# Patient Record
Sex: FEMALE | Race: BLACK OR AFRICAN AMERICAN
[De-identification: names, ages, dates, MRNs, and addresses within clinical notes are randomized per-mention and may not be internally consistent; named-entity substitution may affect disease eponyms.]

---

## 2019-03-06 ENCOUNTER — HOSPITAL ENCOUNTER (EMERGENCY)
Dept: HOSPITAL 88 - FSED | Age: 24
Discharge: HOME | End: 2019-03-06
Payer: COMMERCIAL

## 2019-03-06 VITALS — HEIGHT: 60 IN | BODY MASS INDEX: 53.99 KG/M2 | WEIGHT: 275 LBS

## 2019-03-06 DIAGNOSIS — R06.09: ICD-10-CM

## 2019-03-06 DIAGNOSIS — M25.561: Primary | ICD-10-CM

## 2019-03-06 DIAGNOSIS — J45.909: ICD-10-CM

## 2019-03-06 PROCEDURE — 93971 EXTREMITY STUDY: CPT

## 2019-03-06 PROCEDURE — 93005 ELECTROCARDIOGRAM TRACING: CPT

## 2019-03-06 PROCEDURE — 71046 X-RAY EXAM CHEST 2 VIEWS: CPT

## 2019-03-06 NOTE — DIAGNOSTIC IMAGING REPORT
KNEE 3VW RT - HOPD 



HISTORY:  Right leg swelling.    

COMPARISON: None available.

     

FINDINGS:

Bones:

No acute displaced fracture.  

Osseous alignment is within normal limits.



Joints:

The joint spaces are well-maintained.



Soft tissues:

The soft tissues appear unremarkable.





IMPRESSION: 

No acute radiographic abnormality.



Signed by: DR. Amarjit Kc MD on 3/6/2019 10:34 PM

## 2019-03-06 NOTE — XMS REPORT
Summary of Care: 2/12/15 - 2/12/15

                             Created on: 2124



GLORY WAN

External Reference #: [id number]

: 1995

Sex: Female



Demographics







                          Address                   [id number]

[place]  [address]-

 

                          Home Phone                [phone]

 

                          Preferred Language        English

 

                          Marital Status            Single

 

                          Roman Catholic Affiliation     Unknown

 

                          Race                      Other

 

                          Ethnic Group              Non-





Author







                          Organization              Unknown

 

                          Address                   Unknown

 

                          Phone                     Unavailable







Encounter





HQ Jah(SEKOU) [id number] Date(s): 2/12/15 - 2/12/15

[facility] [address]

Discharge Disposition: Non-Emergent

Physician Attending: Linda Robert MD





Reason for Visit





CONGESTION / SORE THROAT



Vital Signs







 



                           Most recent to            1



                                         oldest [Reference 



                                         Range]: 

 

 



                           Height                    152.4 cm



                                         (2/12/15 1:06 PM)

 

 



                           Temperature Oral          97.9 DegF



                           [96.4-99.1 DegF]          (2/12/15 1:06 PM)

 

 



                           Systolic Blood            16 mmHg



                           Pressure [          *LOW*



                           mmHg]                     (2/12/15 1:06 PM)

 

 



                           Diastolic Blood           82 mmHg



                           Pressure [60-90           (2/12/15 1:06 PM)



                                         mmHg] 

 

 



                           Respiratory Rate          18 BRMIN



                           [14-20 BRMIN]             (2/12/15 1:06 PM)

 

 



                           Peripheral Pulse          92 bpm



                           Rate [ bpm]         (2/12/15 1:06 PM)

 

 



                           Weight                    86.364 kg



                                         (2/12/15 1:06 PM)

 

 



                           Body Mass Index           37.18 m2



                                         (2/12/15 1:06 PM)







Problem List





No data available for this section



Allergies, Adverse Reactions, Alerts







   



                 Substance       Reaction        Severity        Status

 

   



                           sulfa drugs               Active







Medications





No data available for this section



Results





RAPID





 



                           Most recent to            1



                                         oldest [Reference 



                                         Range]: 

 

 



                           Grp A Strep Scr           Negative



                           [Negative]                (2/12/15 2:48 PM)







VIRAL - SEROLOGY





 



                           Most recent to            1



                                         oldest [Reference 



                                         Range]: 

 

 



                           Influ A [Negative]        Negative



                                         (2/12/15 2:48 PM)

 

 



                           Influ B [Negative]        Negative 1



                                         (2/12/15 2:48 PM)







1Interpretive Data:   Influenza A&B Antigen:

Due to the low sensitivity of this test a negative result does not exclude influ
ward virus infection. A diagnosis of influenza should be considered based on a p
atient's clinical presentation and empiric antiviral treatment should be conside
red, if indicated. If more conclusive testing is desired, follow-up confirmatory
 testing with either viral culture or PCR is warranted.



Medications Administered During Your Visit





No data available for this section



Immunizations





No data available for this section



Procedures







   



                 Procedure Type     Body Site       Date of Procedure     Related Diagnosis

 

   



                                         Hernia repair   







Social History







 



                           Social History Type       Response

 

 



                           Substance Abuse           Use: None

 

 



                           Alcohol                   Use: Never

 

 



                           Smoking Status            Never smoker, Exposure to Tobacco Smoke None, Cigarette Smoking

 Last 365



                                         Days No, Reg Smoking Cessation Counseling No

## 2019-03-06 NOTE — XMS REPORT
Summary of Care: 18 - 18

                             Created on: 2085



GLORY MCKENNA

External Reference #: [id number]

: 1995

Sex: Female



Demographics







                          Address                   *[facility] [id number]

[place]  [address]-

 

                          Home Phone                [phone]

 

                          Preferred Language        English

 

                          Marital Status            

 

                          Christian Affiliation     Unknown

 

                          Race                      Black or 

 

                                        Additional Race(s)  

 

                          Ethnic Group              Non-





Author







                          Author                    [facility]

 

                          Organization              [facility]

 

                          Address                   Unknown

 

                          Phone                     Unavailable







Encounter





JOSE Tyson(FIN) [id number] Date(s): 18 - 18

[facility] [address] [facility] [address] [facility] [address]  [address] [place] [address]-      

Discharge Disposition: Home or Self Care

Attending Physician: Micky Robert MD





Vital Signs







 



                           Most recent to            1



                                         oldest [Reference 



                                         Range]: 

 

 



                           Height                    152.4 cm



                                         (18 7:02 PM)

 

 



                           Temperature Oral          97.9 DegF



                           [96.4-99.1 DegF]          (18 7:02 PM)

 

 



                           Blood Pressure            135/84 mmHg



                           [/60-90 mmHg]       (18 7:02 PM)

 

 



                           Respiratory Rate          18 BRMIN



                           [14-20 BRMIN]             (18 7:02 PM)

 

 



                           Peripheral Pulse          79 bpm



                           Rate [ bpm]         (18 7:02 PM)

 

 



                           Weight                    121.818 kg



                                         (18 7:02 PM)

 

 



                           Body Mass Index           52.45 m2



                                         (18 7:02 PM)







Problem List







    



              Condition     Effective Dates     Status       Health Status     Informant

 

    



                           Ear                       Active  



                                         infection(Confirmed)    

 

    



                           Morbid                    Active  



                                         obesity(Confirmed)    







Allergies, Adverse Reactions, Alerts







   



                 Substance       Reaction        Severity        Status

 

   



                           sulfa drugs               Active







Medications





ProAir HFA 90 mcg/inh inhalation aerosol with adapter

2 puff, INHALER, Q6H, PRN wheezing, coughing, or shortness of breath, # 1 ea, 1 
Refill(s)

Start Date: 18

Status: Ordered



Results





No data available for this section



Immunizations





No data available for this section



Procedures







    



              Procedure     Date         Related Diagnosis     Body Site     Status

 

    



                           Hernia repair             Completed







Social History







 



                           Social History Type       Response

 

 



                           Substance Abuse           Use: None.

 

 



                           Alcohol                   Current, Type Wine, Liquor.  Frequency: 1-2 times per month.

 

 



                           Smoking Status            Never smoker; Ready to change: No; Concerns about tobacco use 

in household:



                                         No; Exposure to Tobacco Smoke None; Cigarette Smoking Last 365 Days No; Reg



                                         Smoking Cessation Counseling No



                                         entered on: 18







Assessment and Plan





No data available for this section

## 2019-03-06 NOTE — XMS REPORT
Continuity of Care Document

                             Created on: 2018



GLORY MCKENNA

External Reference #: [id number]

: 1995

Sex: Female



Demographics







                          Address                   [address]

[place]  [address]

 

                          Home Phone                [phone]

 

                          Preferred Language        Unknown

 

                          Marital Status            Unknown

 

                          Sikh Affiliation     Unknown

 

                          Race                      Unknown

 

                          Ethnic Group              Unknown





Author







                          Author                    [facility]              Interface

 

                          Address                   Unknown

 

                          Phone                     Unavailable



                                                    



Problems

                    





                    Problem                            Status                            Onset Date     

                          Classification                            Date Reported       

                          Comments                            Source                    

 

                          Discharge Diagnosis: Atypical chest pain                                          

                    2017

                                                        [facility]                 

   

 

                    CP                            Active                            2017          

                                                                                         

                                        [facility]                    

 

                                        Discharge Diagnosis: Acute suppurative otitis media without spontaneous rupture 

of ear drum, recurrent, right ear                                                  

                    2016     

                                                      [facility]                    

 

                    EAR PAIN                            Active                            2016    

                                                                                       

                                        [facility]                    

 

                          CONGESTION / SORE THROAT                            Active                        

                    2015                                                                         

                                                      [facility]                    

 

                    CHEST PAIN                            Active                            2014  

                                                                                       

                                        [facility]                    

 

                    Ear infection                            Active                                     

                          Problem                            2018                     

                                                      [facility]                    

 

                    Morbid obesity                            Active                                    

                          Problem                            2018                    

                                                       [facility]                    





                                                                                
                                                                                
                                      



Medications

                    





                    Medication                            Details                            Route      

                          Status                            Patient Instructions         

                          Ordering Provider                            Order Date           

                                        Source                    

 

                          cefdinir 300 MG Oral Capsule                            300 mg=1 cap, PO, BID, X 10

 day, # 20 cap, 0 Refill(s), Pharmacy: [id number]                       
                                                No Longer Active                                       

                                                2018                             Medical

 Group                    

 

                                        200 ACTUAT Albuterol 0.09 MG/ACTUAT Metered Dose Inhaler [ProAir HFA]           

                                        2 puff, INHALER, Q6H, PRN wheezing, coughing, or shortness of breath,

 # 1 ea, 1 Refill(s)                                                        Active   

                                                                                 2018

                                         [facility]                    

 

                          tramadol hydrochloride 50 MG Oral Tablet                            50 mg=1 tab, PO,

 Q12H, PRN Pain, X 7 day, # 14 tab, 0 Refill(s)                                    

                    Active                                                             

                          2017                            [facility]            

        

 

                          Ketorolac                            30 mg, Route: IVP, Drug form: INJ, ONCE, Dosing

 Weight 116.818, kg, Priority: STAT, Start date: 17 4:16:00 CDT, Stop 
date: 17 4:16:00 CDT                                                        Inactive

                                                                                    2017

                                        [facility]                    

 

                          Sodium Chloride 0.154 MEQ/ML Injectable Solution                            1,000 

mL, 1000 ml/hr, Infuse Over: 1 hr, Route: IV, 1,000, Drug form: INJ, ONCE, 
Priority: STAT, Dosing Weight 116.818 kg, Start date: 17 1:30:00 CDT, 
Duration: 1 doses or times, Stop date: 17 1:30:00 CDT                     
                                                Inactive                                             

                                                2017                            [facility]

                    

 

                          Clindamycin                            600 mg, Route: IVPB, ONCE, Dosing Weight 116.818,

 kg, Priority: STAT, Start date: 17 1:30:00 CDT, Duration: 1 doses or 
times, Stop date: 17 1:30:00 CDT, ABX Indication: Other (specify in 
Comments)                                                        Inactive            

                                                                            2017     

                                        [facility]                    

 

                                        Acetaminophen 325 MG / Hydrocodone Bitartrate 5 MG Oral Tablet                  

                                        1 tab, Route: PO, Drug Form: TAB, Dosing Weight 116.818, kg, ONCE, STAT, 

Start date: 17 1:30:00 CDT, Stop date: 17 1:30:00 CDT               
                                                Inactive                                       

                                                2017                            [facility]

                    

 

                          Saline Flush 0.9%                            10 mL, Route: IVP, Drug Form: INJ, Dosing

 Weight 116.818, kg, PRN, PRN Line Flush, Start date: 17 20:36:00 CDT, 
Duration: 30 day, Stop date: 17 20:35:00 CDTNotes: (Same as: BD Posiflush)
                                                       No Longer Active              

                                                                            2017       

                                        [facility]                    

 

                          Tetracaine 20 MG/ML Topical Solution                            3 drp, Route: RIGHT

 EAR, ONCE, Start date: 16 7:30:00, Stop date: 16 7:30:00           
                                                Inactive                                   

                                                 2016                            

[facility]                    

 

                                        Amoxicillin 500 MG / Clavulanate 125 MG Oral Tablet [Augmentin 500-mg]          

                                        1 tab, PO, Q8H, X 14 day, # 42 tab, 0 Refill(s)                  

                                                Active                                           

                                                2016                            [facility]

                    



                                                                                
                                                                                
                                                                      



Allergies, Adverse Reactions, Alerts

                    





                    Substance                            Category                            Reaction   

                          Severity                            Reaction type           

                          Status                            Date Reported                     

                          Comments                            Source                    

 

                    sulfa drugs                            Assertion                                    

                                                      Drug allergy                         

                    Active                                                                              

                                         Medical Group                    



                                                                        



Immunizations

                    





                    Immunization                            Date Given                            Site  

                          Status                            Last Updated             

                          Comments                            Source                    



                                                                        



Results

                    





                    Order Name                            Results                            Value      

                          Reference Range                            Date                

                          Interpretation                            Comments                       

                                        Source                    

 

                    URINE AND STOOL                            UA Mucus                            Few /LPF

                              None Seen /LPF                            2017   

                                                                                 [facility]

                    

 

                    URINE AND STOOL                            UA Ketones                            Negative

 mg/dL                              Negative mg/dL                            2017

                                                                                    [facility]                    

 

                    URINE AND STOOL                            UA Glucose                            Negative

 mg/dL                              Negative mg/dL                            2017

                                                                                     

[facility]                    

 

                    URINE AND STOOL                            UA Spec Grav                            1.027

                              <=1.030                            2017          

                                                                             [facility] 

                   

 

                    URINE AND STOOL                            UA Protein                            Negative

 mg/dL                              Negative mg/dL                            2017

                                                                                    [facility]                    

 

                    URINE AND STOOL                            UA pH                            5.0     

                          5.0 - 8.0                            2017             

                                                                             [facility]    

                

 

                    URINE AND STOOL                            UA Turbidity                            Slight

 

*ABN*

                    (17 1:00 AM)                              Clear                            2017

                                                                                     

[facility]                    

 

                    URINE AND STOOL                            UA Color                            Yellow

 

*NA*

                    (17 1:00 AM)                              Yellow                            2017

                                                                                     

[facility]                    

 

                    URINE AND STOOL                            UA RBC                            2 /HPF 

                            0 - 2                            2017              

                                                                             [facility]     

               

 

                    URINE AND STOOL                            UA WBC                            2 /HPF 

                            0 - 5                            2017              

                                                                             [facility]     

               

 

                    URINE AND STOOL                            UA Bacteria                            Occasional

 /HPF                              None Seen /HPF                            2017

                                                                                     

[facility]                    

 

                    URINE AND STOOL                            UA Sq Epi                            Moderate

 /LPF                              Few /LPF                            2017    

                                                                                 [facility]

                    

 

                    URINE AND STOOL                            UA Leuk Est                            Negative

 

                    (17 1:00 AM)                              Negative                            2017

                                                                                    [facility]                    

 

                          URINE AND STOOL                            UA Urobilinogen                        

                    2.0 mg/dL                             0.1 - 1.0                            2017

                                                                                    [facility]                    

 

                    URINE AND STOOL                            UA Nitrite                            Negative

 

                    (17 1:00 AM)                              Negative                            2017

                                                                                    [facility]                    

 

                    URINE AND STOOL                            UA Blood                            Negative

 

                    (17 1:00 AM)                              Negative                            2017

                                                                                    [facility]                    

 

                    URINE AND STOOL                            UA Bili                            Negative

 

*NA*

                    (17 1:00 AM)                              Negative                            2017

                                                                                    [facility]                    

 

                    URINE CHEM                            U Preg                            Negative 

                    (17 1:00 AM)                              Negative                            2017

                                                                                    [facility]                    

 

                    CARDIAC ENZYMES                            CK MB Index                            0.9

                              0.0 - 2.5                            2017        

                                                                            [facility]

                    

 

                    CARDIAC ENZYMES                            Total CK                            74 unit/L

                             12 - 191                            2017          

                                                                            [facility] 

                   

 

                    CARDIAC ENZYMES                            CK MB                            0.7 ng/mL

                             0.5 - 3.6                            2017         

                                                                            [facility]

                    

 

                    CARDIAC ENZYMES                            Troponin-I                            null

                            0.00 - 0.40                            2017        

                                                                            [facility]

                    

 

                    CHEM PANEL                            eGFR                            108 mL/min/1.73m2

                                                         2017                  

                                                      Result Comment: The eGFR is calculated using the

 CKD-EPI formula. In most young, healthy individuals the eGFR will be >90 
mL/min/1.73m2. The eGFR declines with age. An eGFR of 60-89 may be normal in 
some populations, particularly the elderly, for whom the CKD-EPI formula has not
been extensively validated. Use of the eGFR is not recommended in the following 
populations:



Individuals with unstable creatinine concentrations, including pregnant patients
and those with serious co-morbid conditions.



Patients with extremes in muscle mass or diet. 



The data above are obtained from the National Kid[employer] Disease Education Program 
(NKDEP) which additionally recommends that when the eGFR is used in patients 
with extremes of body mass index for purposes of drug dosing, the eGFR should be
multiplied by the estimated BMI.                             [facility]           

         

 

                    CHEM PANEL                            Globulin                            4.0 g/dL  

                           2.7 - 4.2                            2017           

                                                                             [facility]  

                  

 

                    CHEM PANEL                            B/C Ratio                            18       

                          6 - 25                            2017                  

                                                                             [facility]         

           

 

                    CHEM PANEL                            AGAP                            9.4 meq/L     

                          10.0 - 20.0                            2017            

                                                                             [facility]   

                 

 

                    CHEM PANEL                            A/G Ratio                            0.8      

                          0.7 - 1.6                            2017              

                                                                             [facility]     

               

 

                    CHEM PANEL                            Alk Phos                            59 unit/L 

                            39 - 136                            2017           

                                                                             [facility]  

                  

 

                    CHEM PANEL                            AST                            16 unit/L      

                          0 - 37                            2017                  

                                                                             [facility]         

           

 

                    CHEM PANEL                            ALT                            19 unit/L      

                          0 - 65                            2017                  

                                                                             [facility]         

           

 

                    CHEM PANEL                            Albumin Lvl                            3.2 g/dL

                             3.5 - 5.0                            2017         

                                                                             [facility]

                    

 

                    CHEM PANEL                            Bili Total                            0.2 mg/dL

                             0.2 - 1.3                            2017         

                                                                             [facility]

                    

 

                    CHEM PANEL                            Calcium Lvl                            8.6 mg/dL

                             8.5 - 10.5                            2017        

                                                                             [facility]

                    

 

                    CHEM PANEL                            CO2                            26 meq/L       

                          24 - 32                            2017                  

                                                                             [facility]         

           

 

                    CHEM PANEL                            Chloride Lvl                            106 meq/L

                             95 - 109                            2017          

                                                                             [facility] 

                   

 

                    CHEM PANEL                            Potassium Lvl                            3.4 meq/L

                             3.5 - 5.1                            2017         

                                                                             [facility]

                    

 

                    CHEM PANEL                            Total Protein                            7.2 g/dL

                             6.4 - 8.4                            2017         

                                                                             [facility]

                    

 

                    CHEM PANEL                            Sodium Lvl                            138 meq/L

                             135 - 145                            2017         

                                                                             [facility]

                    

 

                    CHEM PANEL                            Creatinine Lvl                            0.87

 mg/dL                             0.50 - 1.40                            2017 

                                                                                    [facility]

                    

 

                    CHEM PANEL                            BUN                            16 mg/dL       

                          7 - 22                            2017                   

                                                                            [facility]          

          

 

                    CHEM PANEL                            Glucose Lvl                            107 mg/dL

                             70 - 99                            2017           

                                                                            [facility]  

                  

 

                    CHEM PANEL                            Phosphorus                            3.4 mg/dL

                             2.5 - 4.5                            2017         

                                                                            [facility]

                    

 

                    CHEM PANEL                            Magnesium Lvl                            2.2 mg/dL

                             1.8 - 2.4                            2017         

                                                                            [facility]

                    

 

                    HEMATOLOGY                            Segs                            48.3 %        

                          45.0 - 75.0                            2017               

                                                                            [facility]      

              

 

                    HEMATOLOGY                            Lymphocytes                            43.6 % 

                            20.0 - 40.0                            2017        

                                                                            [facility]

                    

 

                    HEMATOLOGY                            Eosinophils                            3.0 %  

                           0.0 - 4.0                            2017           

                                                                            [facility]  

                  

 

                    HEMATOLOGY                            Monocytes                            4.5 %    

                          2.0 - 12.0                            2017            

                                                                            [facility]   

                 

 

                    HEMATOLOGY                            Segs-Bands #                            3.7 K/CMM

                             1.5 - 8.1                            2017         

                                                                            [facility]                            Eosinophils #                            0.2 K/CMM

                             0.0 - 0.5                            2017         

                                                                            [facility]                            Lymphocytes #                            3.3 K/CMM

                             1.0 - 5.5                            2017         

                                                                            [facility]                            Basophils                            0.6 %    

                          0.0 - 1.0                            2017             

                                                                            [facility]                            Monocytes #                            0.3 K/CMM

                             0.0 - 0.8                            2017         

                                                                            [facility]                            D-Dimer                            0.45 ug/mL 

FEU                                                         2017               

                                                                            [facility]                            RBC                            4.32 M/CMM     

                          4.20 - 5.40                            2017            

                                                                            [facility]                            Hgb                            12.4 g/dL      

                          12.0 - 16.0                            2017             

                                                                            [facility]                            WBC                            7.6 K/CMM      

                          3.7 - 10.4                            2017              

                                                                            [facility]                            MCV                            86.7 fL        

                          80.0 - 98.0                            2017               

                                                                            [facility]                            MCHC                            33.0 g/dL     

                          32.0 - 36.0                            2017            

                                                                            [facility]                            MCH                            28.6 pg        

                          27.0 - 31.0                            2017               

                                                                            [facility]                            Hct                            37.5 %         

                          36.0 - 48.0                            2017                

                                                                            [facility]                            Platelet                            251 K/CMM 

                            133 - 450                            2017          

                                                                            [facility]                            RDW                            13.8 %         

                          11.5 - 14.5                            2017                

                                                                            [facility]                            MPV                            7.4 fL         

                          7.4 - 10.4                            2017                 

                                                                            [facility]        

            

 

                          Ext Lower Venous Doppler Bilat US                            Ext Lower Venous Doppler

 Bilat US                               Exam:  Bilateral lower extremity venous ultrasound

 



Clinical indication: swelling



Technique: 



Bilateral lower extremity venous ultrasound is performed.



Findings: 



Exam shows intact common femoral, superficial femoral and popliteal veins.  
Normal augmentation and compression is evident in these venous segments. No 
intraluminal thrombus seen. 



Impression:  

  

No evidence for DVT.



                                                          2017                 

                                                      -

                                        -





Read by:  Mukund Boles MD

Dictated Date/time:  17 03:44

Electronically Signed by:  Mukund Boles MD                17 
03:45

FINAL REPORT

                                        [facility]                    

 

                          Chest  Pulmonary Embolism CTA                            Chest  Pulmonary Embolism

 CTA                                    CT ANGIOGRAM OF THE CHEST WITH RECONSTRUCTION DATED

 2017.



CLINICAL INDICATION: Shortness of breath. Elevated d-dimer.



COMPARISON: None.



TECHNIQUE: A dynamic contrast-enhanced helical CT angiogram of the chest was 
performed using pulmonary embolism protocol with subsequent sagittal and coronal
MIP reconstruction. A total of 100 cc of Omnipaque 300 was injected 
intravenously for the exam.



CT radiation dose:  MIA=413 mGy-cm



FINDINGS: 



PULMONARY ARTERIES: The pulmonary arteries appear to enhance normally. No low-
density pulmonary artery filling defects are identified to suggest the CT 
diagnosis of acute pulmonary embolism. 



MEDIASTINUM: There is no CT evidence of a mediastinal mass or pathologically 
enlarged mediastinal lymph nodes. The thoracic aorta is normal in caliber and 
demonstrates no evidence of dissection. The heart is enlarged. No pericardial 
fluid collections are noted.



PLEURAL SPACES: No acute pleural space abnormalities are detected.  



LUNGS: The lungs appear clear of acute disease..



UPPER ABDOMEN: The included upper abdominal organs appear unremarkable.



ADDITIONAL COMMENTS: None.



IMPRESSION:

                                        1. No CT evidence of acute pulmonary embolism. No acute CT abnormalities of the 

lungs, mediastinum or pleural spaces are detected.





SL:131



                                                          2017                 

                                                      -

                                        -





Read by:  Garrick Spivey MD

Dictated Date/time:  17 03:55

Electronically Signed by:  Garrick Spivey MD                  17 
03:58

FINAL REPORT

                                        [facility]                    

 

                          Chest 2 views DX                            Chest 2 views DX                      

                                        Chest 2 views DX 2017 8:36 PM CDT

Ordering Physician: Trisha Sen



CLINICAL HISTORY:  - shortness of breath; chest pain and dizziness



TECHNIQUE: PA and lateral upright views of the chest were obtained.



COMPARISON: None



FINDINGS: 



Lungs are clear.  No pleural effusion or pneumothorax is present.  
Cardiomediastinal silhouette is normal.  Bones are normal. 



   



IMPRESSION:



No acute abnormality of the chest.









SL:  [id number]



                                                          2017                 

                                                      -

                                        -





Read by:  Patience Patel MD

Dictated Date/time:  17 21:20

Electronically Signed by:  Patience Patel MD               17 
21:20

FINAL REPORT

                                        [facility]                    

 

                    RAPID                            Grp A Strep Scr                            Negative

 

                    (2/12/15 2:48 PM)                              Negative                            2015

                                                                                    [facility]                    

 

                    VIRAL - SEROLOGY                            Influ B                            Negative

 1

                    (2/12/15 2:48 PM)                              Negative                            2015

                                                        1Interpretive Data:   Influenza

 A&B Antigen:

Due to the low sensitivity of this test a negative result does not exclude 
influenza virus infection. A diagnosis of influenza should be considered based 
on a patient's clinical presentation and empiric antiviral treatment should be 
considered, if indicated. If more conclusive testing is desired, follow-up 
confirmatory testing with either viral culture or PCR is warranted.             
                                        [facility]                    

 

                    VIRAL - SEROLOGY                            Influ A                            Negative

 

                    (2/12/15 2:48 PM)                              Negative                            2015

                                                                                    [facility]                    

 

                          URINE AND STOOL                            UA Urobilinogen                        

                    <=1.0 mg/dL                              0.1 - 1.0                            11/10/2014

                                                                                    [facility]                    

 

                    URINE AND STOOL                            UA Nitrite                            Negative

 

                    (14 10:27 PM)                              Negative                            

11/10/2014                                                                           

                                        [facility]                    

 

                    URINE AND STOOL                            UA Leuk Est                            Trace

 

*ABN*

                    (14 10:27 PM)                              Negative                            

11/10/2014                                                                           

                                        [facility]                    

 

                    URINE AND STOOL                            UA Blood                            Large

 

*ABN*

                    (14 10:27 PM)                              Negative                            

11/10/2014                                                                           

                                        [facility]                    

 

                    URINE AND STOOL                            UA Bili                            Negative

 

*NA*

                    (14 10:27 PM)                              Negative                            

11/10/2014                                                                           

                                        [facility]                    

 

                    URINE AND STOOL                            UA Sq Epi                            Many

 /LPF                              Few /LPF                            11/10/2014    

                                                                                [facility]

                    

 

                    URINE AND STOOL                            UA WBC                            12 /HPF

                             0 - 5                            11/10/2014             

                                                                            [facility]    

                

 

                    URINE AND STOOL                            UA Bacteria                            Occasional

 /HPF                              None Seen /HPF                            11/10/2014

                                                                                    [facility]                    

 

                          URINE AND STOOL                            UA Amorph Corinna                         

                          Occasional /HPF                              None Seen /HPF                       

                    11/10/2014                                                                        

                                        [facility]                    

 

                    URINE AND STOOL                            UA RBC                            5 /HPF 

                            0 - 2                            11/10/2014              

                                                                             [facility]     

               

 

                    URINE AND STOOL                            UA Spec Grav                            1.019

                              <=1.030                            11/10/2014          

                                                                             [facility] 

                   

 

                    URINE AND STOOL                            UA pH                            6.0     

                          5.0 - 8.0                            11/10/2014             

                                                                             [facility]    

                

 

                    URINE AND STOOL                            UA Ketones                            Negative

 mg/dL                              Negative mg/dL                            11/10/2014

                                                                                     

[facility]                    

 

                    URINE AND STOOL                            UA Protein                            Negative

 mg/dL                              Negative mg/dL                            11/10/2014

                                                                                     

[facility]                    

 

                    URINE AND STOOL                            UA Glucose                            Negative

 mg/dL                              Negative mg/dL                            11/10/2014

                                                                                    [facility]                    

 

                    URINE AND STOOL                            UA Turbidity                            Marked

 

*ABN*

                    (14 10:27 PM)                              Clear                            11/10/2014

                                                                                    [facility]                    

 

                    URINE AND STOOL                            UA Color                            Yellow

 

*NA*

                    (14 10:27 PM)                              Yellow                            11/10/2014

                                                                                    [facility]                    

 

                    URINE CHEM                            U Preg                            Negative 

                    (14 10:27 PM)                              Negative                            

11/10/2014                                                                           

                                        [facility]                    



                                                                                
                                                                                
                                                                                
                                                                                
                                                                                
                                                                                
                                                                                
                                                                                
                                                                                
                                                                                
                                                                                
                                                                                
                                                                                
                                                                                
                                                                                
                                                                                
                                                                                
                                                                                
              



Vital Signs

                    





                    Vital Sign                            Value                            Date         

                          Comments                            Source                    

 

                    Height                            152.4 cm                            2018    

                                                       [facility]                 

   

 

                    BMI Calculated                            51.58                             2018

                                                         [facility]             

       

 

                    Heart Rate                            83                             2018     

                                                       [facility]                  

  

 

                    Temperature Oral (F)                            98.3 F                            2018

                                                         [facility]             

       

 

                    Respitory Rate                            16                             2018 

                                                        [facility]              

      

 

                    Weight                            119.801                             2018    

                                                       [facility]                 

   

 

                    Systolic (mm Hg)                            125                             2018

                                                         [facility]             

       

 

                    Diastolic (mm Hg)                            80                             2018

                                                         [facility]             

       

 

                    Height                            152.4 cm                            2018    

                                                       [facility]                 

   

 

                    Weight                            121.818                             2018    

                                                       [facility]                 

   

 

                    BMI Calculated                            52.45                             2018

                                                         [facility]             

       

 

                    Temperature Oral (F)                            97.9 F                            2018

                                                         [facility]             

       

 

                    Respitory Rate                            18                             2018 

                                                        [facility]              

      

 

                    Heart Rate                            79                             2018     

                                                       [facility]                  

  

 

                    Systolic (mm Hg)                            135                             2018

                                                         [facility]             

       

 

                    Diastolic (mm Hg)                            84                             2018

                                                         [facility]             

       

 

                    Heart Rate                            87                             2017     

                                                      [facility]                    

 

                    Respitory Rate                            18                             2017 

                                                       [facility]                  

  

 

                    Systolic (mm Hg)                            107                             2017

                                                        [facility]                 

   

 

                    Diastolic (mm Hg)                            78                             2017

                                                        [facility]                 

   

 

                    Respitory Rate                            18                             2017 

                                                       [facility]                  

  

 

                    Heart Rate                            78                             2017     

                                                      [facility]                    

 

                    Systolic (mm Hg)                            94                             2017

                                                        [facility]                 

   

 

                    Diastolic (mm Hg)                            78                             2017

                                                        [facility]                 

   

 

                    BMI Calculated                            50.3                             2017

                                                         [facility]                 

   

 

                    Height                            152.4 cm                            2017    

                                                       [facility]                    

 

                    Weight                            116.818                             2017    

                                                       [facility]                    

 

                    Respitory Rate                            18                             2017 

                                                       [facility]                  

  

 

                    Temperature Oral (F)                            98.4 F                            2017

                                                         [facility]                 

   

 

                    Heart Rate                            82                             2017     

                                                       [facility]                    

 

                    Systolic (mm Hg)                            138                             2017

                                                         [facility]                 

   

 

                    Diastolic (mm Hg)                            92                             2017

                                                         [facility]                 

   

 

                    BMI Calculated                            46.97                             2016

                                                         [facility]                 

   

 

                    Weight                            109.091                             2016    

                                                       [facility]                    

 

                    Height                            152.4 cm                            2016    

                                                      [facility]                    

 

                    Temperature Oral (F)                            98.0 F                            2016

                                                        [facility]                 

   

 

                    Heart Rate                            94                             2016     

                                                       [facility]                    

 

                    Respitory Rate                            18                             2016 

                                                        [facility]                  

  

 

                    Systolic (mm Hg)                            142                             2016

                                                         [facility]                 

   

 

                    Diastolic (mm Hg)                            82                             2016

                                                        [facility]                 

   

 

                    Temperature Oral (F)                            97.9 F                            2015

                                                         [facility]                 

   

 

                    Systolic (mm Hg)                            16                             2015

                                                         [facility]                 

   

 

                    Diastolic (mm Hg)                            82                             2015

                                                         [facility]                 

   

 

                    Respitory Rate                            18                             2015 

                                                       [facility]                  

  

 

                    Heart Rate                            92                             2015     

                                                      [facility]                    

 

                    BMI Calculated                            37.18                             2015

                                                         [facility]                 

   

 

                    Height                            152.4 cm                            2015    

                                                       [facility]                    

 

                    Weight                            86.364                             2015     

                                                       [facility]                    

 

                    Weight                            90.909                             11/10/2014     

                                                       [facility]                    

 

                    Height                            152.4 cm                            11/10/2014    

                                                      [facility]                    

 

                    BMI Calculated                            39.14                             11/10/2014

                                                         [facility]                 

   

 

                    Diastolic (mm Hg)                            72                             11/10/2014

                                                         [facility]                 

   

 

                    Systolic (mm Hg)                            114                             11/10/2014

                                                         [facility]                 

   

 

                    Respitory Rate                            22                             11/10/2014 

                                                       [facility]                  

  

 

                    Heart Rate                            104                             11/10/2014    

                                                      [facility]                    

 

                    Temperature Oral (F)                            98.5 F                            11/10/2014

                                                        [facility]                 

   



                                                                                
                                                                                
                                                                                
                                                                                
                                                                                
                                                                                
                                                                                
                                                                                
                                                                                
                                                                                
                                                                                
                                                                             



Encounters

                    





                    Location                            Location Details                            Encounter

 Type                            Encounter Number                            Reason For

 Visit                            Attending Provider                            ADM Date

                            DC Date                            Status                

                                        Source                    

 

                          [facility]                            [id number]                 

                                                Chace Damon                             11/10/2014

                            11/10/2014                                               

                                        [facility]                                               

                          [facility]                            [id number]                 

                                                Linda Robert                             2015                                               

                                        [facility]                                               

                          [facility]                            [id number]                 

                                                Lizzie Sibley                             2016                                               

                                        [facility]                    

 

                                                                            Outpatient                  

                    [id number]                                                        [facility] PIL[facility]

                             2017                                              

                          Active                            [facility]                    

 

                                                                            Outpatient                  

                    [id number]                                                        DON MATA 

                            2017                                               

                          Active                            [facility]                                               

                    Emergency                            [id number]                             

                           Mikie Marques                             2017                                                        

[facility]                    

 

                                                                            Outpatient                  

                    [id number]                                                        DON ESPERANZA 

                            2018                                               

                          Active                            [facility]                                         

                          Outpatient                            [id number]                    

                                                Nanci Robert                             2018                                                

                                         [facility]                    

 

                                                                            Outpatient                  

                    [id number]                                                        JASMYNE MORRELL

                             2018                                              

                          Active                            [facility]                                                        Outpatient

                            [id number]                                             

                                                  2018

                                                         Medical Group             

       



                                                                                
                                                                                
                              



Procedures

                    





                    Procedure                            Code                            Date           

                          Perfomer                            Comments                        

                                        Source                    

 

                    Hernia repair                            [id number]                                   

                                                                             [facility]

                    

 

                    Hernia repair                            [id number]                                   

                                                                              Medical

 Group

## 2019-03-06 NOTE — XMS REPORT
Summary of Care: 18 - 18

                             Created on: 2070



GLORY MCKENNA

External Reference #: [id number]

: 1995

Sex: Female



Demographics







                          Address                   *[id number]

[place]  [address]-

 

                          Home Phone                [phone]

 

                          Preferred Language        English

 

                          Marital Status            

 

                          Confucianist Affiliation     Unknown

 

                          Race                      Black or 

 

                                        Additional Race(s)  

 

                          Ethnic Group              Non-





Author







                          Author                     [facility]

 

                          Address                   Unknown

 

                          Phone                     Unavailable







Encounter





JOSE Tyson(FIN) [id number] Date(s): 18 - 18

 [facility] [address] [place] [address]-     [phone]
[phone]

Discharge Disposition: Home or Self Care





Vital Signs







 



                           Most recent to            1



                                         oldest [Reference 



                                         Range]: 

 

 



                           Height                    152.4 cm



                                         (18 9:28 AM)

 

 



                           Temperature Oral          98.3 DegF



                           [96.4-99.1 DegF]          (18 9:28 AM)

 

 



                           Blood Pressure            125/80 mmHg



                           [/60-90 mmHg]       (18 9:28 AM)

 

 



                           Respiratory Rate          16 BRMIN



                           [14-20 BRMIN]             (18 9:28 AM)

 

 



                           Peripheral Pulse          83 bpm



                           Rate [ bpm]         (18 9:28 AM)

 

 



                           Weight                    119.801 kg



                                         (18 9:28 AM)

 

 



                           Body Mass Index           51.58 m2



                                         (18 9:28 AM)







Problem List







    



              Condition     Effective Dates     Status       Health Status     Informant

 

    



                           Ear                       Active  



                                         infection(Confirmed)    

 

    



                           Morbid                    Active  



                                         obesity(Confirmed)    







Allergies, Adverse Reactions, Alerts







   



                 Substance       Reaction        Severity        Status

 

   



                           sulfa drugs               Active







Medications





cefdinir 300 mg oral capsule

300 mg=1 cap, PO, BID, X 10 day, # 20 cap, 0 Refill(s), Pharmacy: [facility]/pharmacy #
[id number]

Start Date: 18

Stop Date: 18

Status: Completed



Results





No data available for this section



Immunizations





No data available for this section



Procedures







    



              Procedure     Date         Related Diagnosis     Body Site     Status

 

    



                           Hernia repair             Completed







Social History







 



                           Social History Type       Response

 

 



                           Substance Abuse           Use: None.

 

 



                           Alcohol                   Current, Type Wine, Liquor.  Frequency: 1-2 times per month.

 

 



                           Smoking Status            Never smoker; Ready to change: No; Concerns about tobacco use 

in household:



                                         No; Exposure to Tobacco Smoke None; Cigarette Smoking Last 365 Days No; Reg



                                         Smoking Cessation Counseling No



                                         entered on: 18







Assessment and Plan





No data available for this section

## 2019-03-06 NOTE — XMS REPORT
Summary of Care: 17 - 17

                             Created on: 2076



GLORY WAN

External Reference #: [id number]

: 1995

Sex: Female



Demographics







                          Address                   [id number]

[place]  [address]-

 

                          Home Phone                [phone]

 

                          Preferred Language        English

 

                          Marital Status            

 

                          [employer] Affiliation     Unknown

 

                          Race                      

 

                          Ethnic Group              Non-





Author







                          Author                    [facility]

 

                          Organization              [facility]

 

                          Address                   Unknown

 

                          Phone                     Unavailable







Encounter





JOSE Tyson(SEKOU) [phone] Date(s): 17 - 17

[facility] [address] [place] [address]-     [phone]
[phone]

Discharge Diagnosis: Atypical chest pain

Discharge Disposition: Home or Self Care

Attending Physician: Mikie Marques DO





Vital Signs







                    1                   2                   3



                                         Most recent to   



                                         oldest [Reference   



                                         Range]:   

 

                                        152.4 cm 

                    (17 8:33 PM)                         



                                         Height   

 

                                        98.4 DegF 

                    (17 8:33 PM)                         



                                         Temperature Oral   



                                         [96.4-99.1 DegF]   

 

                                        107/78 mmHg 

                          (17 5:16 AM)         94/78 mmHg 

                          (17 1:01 AM)         138/92 mmHg 

                                        (17 8:33 PM)



                                         Blood Pressure   



                                         [/60-90 mmHg]   

 

                                        18 BRMIN 

                          (17 5:16 AM)         18 BRMIN 

                          (17 1:01 AM)         18 BRMIN 

                                        (17 8:33 PM)



                                         Respiratory Rate   



                                         [14-20 BRMIN]   

 

                                        87 bpm 

                          (17 5:16 AM)         78 bpm 

                          (17 1:01 AM)         82 bpm 

                                        (17 8:33 PM)



                                         Peripheral Pulse   



                                         Rate [ bpm]   

 

                                        116.818 kg 

                    (17 8:33 PM)                         



                                         Weight   

 

                                        50.3 m2 

                    (17 8:33 PM)                         



                                         Body Mass Index   







Problem List







    



              Condition     Effective Dates     Status       Health Status     Informant

 

    



                           Ear                       Active  



                                         infection(Confirmed)    

 

    



                           Morbid                    Active  



                                         obesity(Confirmed)    







Allergies, Adverse Reactions, Alerts







   



                 Substance       Reaction        Severity        Status

 

   



                           sulfa drugs               Active







Medications





acetaminophen-hydrocodone 325 mg-5 mg oral tablet

1 tab, Route: PO, Drug Form: TAB, Dosing Weight 116.818, kg, ONCE, STAT, Start d
ate: 17 1:30:00 CDT, Stop date: 17 1:30:00 CDT

Start Date: 17

Stop Date: 17

Status: Discontinued



clindamycin

600 mg, Route: IVPB, ONCE, Dosing Weight 116.818, kg, Priority: STAT, Start date
: 17 1:30:00 CDT, Duration: 1 doses or times, Stop date: 17 1:30:00 
CDT, ABX Indication: Other (specify in Comments)

Start Date: 17

Stop Date: 17

Status: Discontinued



ketOROLAC

30 mg, Route: IVP, Drug form: INJ, ONCE, Dosing Weight 116.818, kg, Priority: ST
AT, Start date: 17 4:16:00 CDT, Stop date: 17 4:16:00 CDT

Start Date: 17

Stop Date: 17

Status: Completed



Saline Flush 0.9%

10 mL, Route: IVP, Drug Form: INJ, Dosing Weight 116.818, kg, PRN, PRN Line Flus
h, Start date: 17 20:36:00 CDT, Duration: 30 day, Stop date: 17 20:3
5:00 CDT

Notes: (Same as: BD Posiflush)

Start Date: 17

Stop Date: 17

Status: Discontinued



Sodium Chloride 0.9% (Bolus) IV

1,000 mL, 1000 ml/hr, Infuse Over: 1 hr, Route: IV, 1,000, Drug form: INJ, ONCE,
Priority: STAT, Dosing Weight 116.818 kg, Start date: 17 1:30:00 CDT, Dur
ation: 1 doses or times, Stop date: 17 1:30:00 CDT

Start Date: 17

Stop Date: 17

Status: Completed



tramadol 50 mg oral tablet

50 mg=1 tab, PO, Q12H, PRN Pain, X 7 day, # 14 tab, 0 Refill(s)

Start Date: 17

Stop Date: 17

Status: Ordered



Results





ELECTROLYTES





 



                           Most recent to            1



                                         oldest [Reference 



                                         Range]: 

 

 



                           Sodium Lvl [135-145       138 mEq/L



                           mEq/L]                    (17 11:36 PM)

 

 



                           Potassium Lvl             3.4 mEq/L



                           [3.5-5.1 mEq/L]           *LOW*



                                         (17 11:36 PM)

 

 



                           Chloride Lvl [      106 mEq/L



                           mEq/L]                    (17 11:36 PM)

 

 



                           CO2 [24-32 mEq/L]         26 mEq/L



                                         (17 11:36 PM)

 

 



                           AGAP [10.0-20.0           9.4 mEq/L



                           mEq/L]                    *LOW*



                                         (17 11:36 PM)







CHEM PANEL





 



                           Most recent to            1



                                         oldest [Reference 



                                         Range]: 

 

 



                           Creatinine Lvl            0.87 mg/dL



                           [0.50-1.40 mg/dL]         (17 11:36 PM)

 

 



                           eGFR                      108 mL/min/1.73m2 1



                                         *NA*



                                         (17 11:36 PM)

 

 



                           BUN [7-22 mg/dL]          16 mg/dL



                                         (17 11:36 PM)

 

 



                           B/C Ratio [6-25]          18



                                         (17 11:36 PM)

 

 



                           Glucose Lvl [70-99        107 mg/dL



                           mg/dL]                    *HI*



                                         (17 11:36 PM)

 

 



                           Total Protein             7.2 g/dL



                           [6.4-8.4 g/dL]            (17 11:36 PM)

 

 



                           Albumin Lvl [3.5-5.0      3.2 g/dL



                           g/dL]                     *LOW*



                                         (17 11:36 PM)

 

 



                           Globulin [2.7-4.2         4.0 g/dL



                           g/dL]                     (17 11:36 PM)

 

 



                           A/G Ratio [0.7-1.6]       0.8



                                         (17 11:36 PM)

 

 



                           Calcium Lvl               8.6 mg/dL



                           [8.5-10.5 mg/dL]          (17 11:36 PM)

 

 



                           Phosphorus [2.5-4.5       3.4 mg/dL



                           mg/dL]                    (17 11:36 PM)

 

 



                           Magnesium Lvl             2.2 mg/dL



                           [1.8-2.4 mg/dL]           (17 11:36 PM)

 

 



                           ALT [0-65 unit/L]         19 unit/L



                                         (17 11:36 PM)

 

 



                           AST [0-37 unit/L]         16 unit/L



                                         (17 11:36 PM)

 

 



                           Alk Phos [          59 unit/L



                           unit/L]                   (17 11:36 PM)

 

 



                           Bili Total [0.2-1.3       0.2 mg/dL



                           mg/dL]                    (17 11:36 PM)







1Result Comment: The eGFR is calculated using the CKD-EPI formula. In most 
young, healthy individuals the eGFR will be >90 mL/min/1.73m2. The eGFR declines
with age. An eGFR of 60-89 may be normal in some populations, particularly the 
elderly, for whom the CKD-EPI formula has not been extensively validated. Use of
the eGFR is not recommended in the following populations:



Individuals with unstable creatinine concentrations, including pregnant patients
and those with serious co-morbid conditions.



Patients with extremes in muscle mass or diet. 



The data above are obtained from the National Kidney Disease Education Program (
NKDEP) which additionally recommends that when the eGFR is used in patients with
extremes of body mass index for purposes of drug dosing, the eGFR should be mul
tiplied by the estimated BMI.



CARDIAC ENZYMES





 



                           Most recent to            1



                                         oldest [Reference 



                                         Range]: 

 

 



                           Total CK [          74 unit/L



                           unit/L]                   (17 11:36 PM)

 

 



                           CK MB [0.5-3.6            0.7 ng/mL



                           ng/mL]                    (17 11:36 PM)

 

 



                           CK MB Index               0.9



                           [0.0-2.5]                 (17 11:36 PM)

 

 



                           Troponin-I                <0.02 ng/mL



                           [0.00-0.40 ng/mL]         (17 11:36 PM)







URINE CHEM





 



                           Most recent to            1



                                         oldest [Reference 



                                         Range]: 

 

 



                           U Preg [Negative]         Negative



                                         (17 1:00 AM)







URINE AND STOOL





 



                           Most recent to            1



                                         oldest [Reference 



                                         Range]: 

 

 



                           UA Turbidity [Clear]      Slight



                                         *ABN*



                                         (17 1:00 AM)

 

 



                           UA Color [Yellow]         Yellow



                                         *NA*



                                         (17 1:00 AM)

 

 



                           UA pH [5.0-8.0]           5.0



                                         (17 1:00 AM)

 

 



                           UA Spec Grav              1.027



                           [<=1.030]                 (17 1:00 AM)

 

 



                           UA Glucose [Negative      Negative mg/dL



                           mg/dL]                    *NA*



                                         (17 1:00 AM)

 

 



                           UA Blood [Negative]       Negative



                                         (17 1:00 AM)

 

 



                           UA Ketones [Negative      Negative mg/dL



                           mg/dL]                    *NA*



                                         (17 1:00 AM)

 

 



                           UA Protein [Negative      Negative mg/dL



                           mg/dL]                    (17 1:00 AM)

 

 



                           UA Urobilinogen           2.0 mg/dL



                           [0.1-1.0 mg/dL]           *HI*



                                         (17 1:00 AM)

 

 



                           UA Bili [Negative]        Negative



                                         *NA*



                                         (17 1:00 AM)

 

 



                           UA Leuk Est               Negative



                           [Negative]                (17 1:00 AM)

 

 



                           UA Nitrite                Negative



                           [Negative]                (17 1:00 AM)

 

 



                           UA WBC [0-5 /HPF]         2 /HPF



                                         (17 1:00 AM)

 

 



                           UA RBC [0-2 /HPF]         2 /HPF



                                         (17 1:00 AM)

 

 



                           UA Bacteria [None         Occasional /HPF



                           Seen /HPF]                *NA*



                                         (17 1:00 AM)

 

 



                           UA Sq Epi [Few /LPF]      Moderate /LPF



                                         *ABN*



                                         (17 1:00 AM)

 

 



                           UA Mucus [None Seen       Few /LPF



                           /LPF]                     *NA*



                                         (17 1:00 AM)







HEMATOLOGY





 



                           Most recent to            1



                                         oldest [Reference 



                                         Range]: 

 

 



                           WBC [3.7-10.4 K/CMM]      7.6 K/CMM



                                         (17 11:36 PM)

 

 



                           RBC [4.20-5.40            4.32 M/CMM



                           M/CMM]                    (17 11:36 PM)

 

 



                           Hgb [12.0-16.0 g/dL]      12.4 g/dL



                                         (17 11:36 PM)

 

 



                           Hct [36.0-48.0 %]         37.5 %



                                         (17 11:36 PM)

 

 



                           MCV [80.0-98.0 fL]        86.7 fL



                                         (17 11:36 PM)

 

 



                           MCH [27.0-31.0 pg]        28.6 pg



                                         (17 11:36 PM)

 

 



                           MCHC [32.0-36.0           33.0 g/dL



                           g/dL]                     (17 11:36 PM)

 

 



                           RDW [11.5-14.5 %]         13.8 %



                                         (17 11:36 PM)

 

 



                           Platelet [133-450         251 K/CMM



                           K/CMM]                    (17 11:36 PM)

 

 



                           MPV [7.4-10.4 fL]         7.4 fL



                                         (17 11:36 PM)

 

 



                           Segs [45.0-75.0 %]        48.3 %



                                         (17 11:36 PM)

 

 



                           Lymphocytes               43.6 %



                           [20.0-40.0 %]             *HI*



                                         (17 11:36 PM)

 

 



                           Monocytes [2.0-12.0       4.5 %



                           %]                        (17 11:36 PM)

 

 



                           Eosinophils [0.0-4.0      3.0 %



                           %]                        (17 11:36 PM)

 

 



                           Basophils [0.0-1.0        0.6 %



                           %]                        (17 11:36 PM)

 

 



                           Segs-Bands #              3.7 K/CMM



                           [1.5-8.1 K/CMM]           (17 11:36 PM)

 

 



                           Lymphocytes #             3.3 K/CMM



                           [1.0-5.5 K/CMM]           (17 11:36 PM)

 

 



                           Monocytes # [0.0-0.8      0.3 K/CMM



                           K/CMM]                    (17 11:36 PM)

 

 



                           Eosinophils #             0.2 K/CMM



                           [0.0-0.5 K/CMM]           (17 11:36 PM)

 

 



                           D-Dimer                   0.45 ug/mL FEU



                                         *NA*



                                         (17 11:36 PM)







Immunizations





No data available for this section



Procedures







   



                 Procedure       Date            Related Diagnosis     Body Site

 

   



                                         Hernia repair   







Social History







 



                           Social History Type       Response

 

 



                           Substance Abuse           Use: None.

 

 



                           Alcohol                   Current, Type Wine, Liquor.  Frequency: 1-2 times per month.

 

 



                           Smoking Status            Never smoker; Exposure to Tobacco Smoke None; Cigarette Smoking

 Last 365



                                         Days No; Reg Smoking Cessation Counseling No







Assessment and Plan





No data available for this section

## 2019-03-06 NOTE — XMS REPORT
Summary of Care: 16 - 16

                             Created on: 2061



GLORY WAN

External Reference #: [id number]

: 1995

Sex: Female



Demographics







                          Address                   [id number]

[place]  [address]-

 

                          Home Phone                [phone]

 

                          Preferred Language        English

 

                          Marital Status            Single

 

                          Voodoo Affiliation     Unknown

 

                          Race                      Other

 

                          Ethnic Group              Non-





Author







                          Author                    [facility]

 

                          Organization              [facility]

 

                          Address                   Unknown

 

                          Phone                     Unavailable







Encounter





JOSE Tyson(SEKOU) [phone] Date(s): 16 - 16

[facility] [address] [place] [address]-     [phone]
[phone]

Discharge Diagnosis: Acute suppurative otitis media without spontaneous rupture 
of ear drum, recurrent, right ear

Discharge Disposition: Home

Attending Physician: Lizzie Sibley DO





Vital Signs







 



                           Most recent to            1



                                         oldest [Reference 



                                         Range]: 

 

 



                           Height                    152.4 cm



                                         (16 6:34 AM)

 

 



                           Temperature Oral          98.0 DegF



                           [96.4-99.1 DegF]          (16 6:34 AM)

 

 



                           Blood Pressure            142/82 mmHg



                           [/60-90 mmHg]       *HI*



                                         (16 6:34 AM)

 

 



                           Respiratory Rate          18 BRMIN



                           [14-20 BRMIN]             (16 6:34 AM)

 

 



                           Peripheral Pulse          94 bpm



                           Rate [ bpm]         (16 6:34 AM)

 

 



                           Weight                    109.091 kg



                                         (16 6:34 AM)

 

 



                           Body Mass Index           46.97 m2



                                         (16 6:34 AM)







Problem List







    



              Condition     Effective Dates     Status       Health Status     Informant

 

    



                           Ear                       Active  



                                         infection(Confirmed)    







Allergies, Adverse Reactions, Alerts







   



                 Substance       Reaction        Severity        Status

 

   



                           sulfa drugs               Active







Medications





Augmentin 500 mg oral tablet

1 tab, PO, Q8H, X 14 day, # 42 tab, 0 Refill(s)

Start Date: 16

Stop Date: 3/5/16

Status: Ordered



tetracaine topical 2% solution

3 drp, Route: RIGHT EAR, ONCE, Start date: 16 7:30:00, Stop date: 16
7:30:00

Start Date: 16

Stop Date: 16

Status: Completed



Results





No data available for this section



Immunizations





No data available for this section



Procedures







   



                 Procedure       Date            Related Diagnosis     Body Site

 

   



                                         Hernia repair   







Social History







 



                           Social History Type       Response

 

 



                           Substance Abuse           Use: None.

 

 



                           Alcohol                   Never

 

 



                           Smoking Status            Never smoker; Exposure to Tobacco Smoke None; Cigarette Smoking

 Last 365



                                         Days No; Reg Smoking Cessation Counseling No







Assessment and Plan





No data available for this section

## 2019-03-07 VITALS — DIASTOLIC BLOOD PRESSURE: 90 MMHG | SYSTOLIC BLOOD PRESSURE: 148 MMHG

## 2019-03-07 NOTE — DIAGNOSTIC IMAGING REPORT
EXAM: Right Lower Extremity Venous Duplex Ultrasound

INDICATION:   Swelling       

COMPARISON:  None 

TECHNIQUE: Gray scale, color Doppler and spectral waveform analysis of the

right lower extremity deep venous system was performed. 



FINDINGS: 

Common Femoral: 

     Fully compressible with normal spontaneous waveforms. 



Proximal Greater Saphenous:

     Fully compressible.

 

Femoral: 

     Fully compressible with normal spontaneous waveforms.  Normal response to

augmentation.



Proximal Deep Femoral:

     Normal spontaneous waveforms. 



Popliteal:     

     Fully compressible with normal spontaneous waveforms.  



Posterior tibial: 

     Normal spontaneous waveforms.



IMPRESSION:     

No evidence of deep venous thrombosis above the right calf. 



Signed by: DR. Amarjit Kc MD on 3/7/2019 12:10 AM

## 2022-03-28 NOTE — DIAGNOSTIC IMAGING REPORT
EXAMINATION:  CXR 2 VIEW - HOPD    



INDICATION: Shortness of breath  

^[id number]

^2210



COMPARISON:  None

     

FINDINGS:  PA and lateral views



TUBES and LINES:  None.



LUNGS:  Lungs are well inflated.  Lungs are clear.   There is no evidence of

pneumonia or pulmonary edema.



PLEURA:  No pleural effusion or pneumothorax.



HEART AND MEDIASTINUM:  Mild enlargement of the cardiac silhouette.    



BONES AND SOFT TISSUES:  No acute osseous lesion.  Soft tissues are

unremarkable.



UPPER ABDOMEN: No free air under the diaphragm.    



IMPRESSION: 

No acute thoracic abnormality.





Signed by: DR. Amarjit Kc MD on 3/6/2019 10:33 PM 2